# Patient Record
Sex: FEMALE | Race: ASIAN | URBAN - METROPOLITAN AREA
[De-identification: names, ages, dates, MRNs, and addresses within clinical notes are randomized per-mention and may not be internally consistent; named-entity substitution may affect disease eponyms.]

---

## 2019-07-05 ENCOUNTER — EMERGENCY (EMERGENCY)
Facility: HOSPITAL | Age: 59
LOS: 1 days | Discharge: LEFT BEFORE DISCHARGE | End: 2019-07-05
Attending: EMERGENCY MEDICINE | Admitting: EMERGENCY MEDICINE
Payer: MEDICAID

## 2019-07-05 VITALS
SYSTOLIC BLOOD PRESSURE: 139 MMHG | DIASTOLIC BLOOD PRESSURE: 88 MMHG | HEART RATE: 80 BPM | RESPIRATION RATE: 18 BRPM | TEMPERATURE: 98 F

## 2019-07-05 PROCEDURE — 99282 EMERGENCY DEPT VISIT SF MDM: CPT

## 2019-07-05 NOTE — ED PROVIDER NOTE - CLINICAL SUMMARY MEDICAL DECISION MAKING FREE TEXT BOX
Terrance: Has ALS. Accepted by [facility] for FB in throat or esophagus after eating a peach. No stridor. Feels she's speaking differently from usual. Notify ENT they're here.

## 2019-07-05 NOTE — ED PROVIDER NOTE - PHYSICAL EXAMINATION
Chronically-ill appearing, not acutely-ill appearing, well nourished, awake, alert, oriented to person, place, time/situation and in no apparent distress.    Airway patent. No stridor. Handling secretions.    Eyes without scleral injection. No jaundice.    Strong pulse.    Respirations unlabored.    Abdomen soft, non-tender, no guarding.    No synovitis.     Alert and oriented.    Skin normal color for race, warm, dry and intact. No evidence of rash.    No SI/HI.

## 2019-07-05 NOTE — CONSULT NOTE ADULT - SUBJECTIVE AND OBJECTIVE BOX
HPI: 59F with PMH of ALS, presents to the ED after episode of distress after eating. She was eating a peach ~6 hours ago and felt something get stuck in her throat. For a brief 15min period she had difficulty breathing. Now she denies any respiratory distress, no dysphagia, no odynophagia. Denies otalgia, vomiting, nausea, chest pain.     T(C): 36.6 (07-05-19 @ 19:25), Max: 36.6 (07-05-19 @ 19:25)  HR: 80 (07-05-19 @ 19:25) (80 - 80)  BP: 139/88 (07-05-19 @ 19:25) (139/88 - 139/88)  RR: 18 (07-05-19 @ 19:25) (18 - 18)  SpO2: --  NAD, AOx3  No respiratory distress, stridor, stertor on RA  Voice quality: normal  PERRL, EOMI  Nose: bilateral NC clear anteriorly, IT normal, mucosa normal  OC/OP: tongue and FOM soft, no lesions, OP clear  Neck: soft and flat, no LAD, no TTP  CN II-XII intact    FOE: NC/NP: bilateral IT and MT normal in appearance. mucosa normal, no lesions. NP clear  OP: BOT and tonsils normal, no lesions. Mucosa normal.  Suprglottis: Epiglottis, AE folds, Arytenoids all sharp without edema. Mucosa normal, no lesions.  Glottis: TVC fully mobile bilaterally, no lesions, airway grossly patent.  Subglottis: clear, no lesions  Hypopharynx: No pooling of secretions, mucosa normal, no lesions.    OSH CXR: per report clear, did not review    A/P: 59F with PMH ALS and voice changes after eating a peach, there are no gross anatomic abnormalities noted on fiberoptic exam, no FB noted.  - no acute ENT intervention  - diet advanced as tolerated  - pain control  - clear for discharge per ED  - please page with questions HPI: 59F with PMH of ALS, HTN, DM, presents to the ED after episode of distress after eating. She was eating a peach ~6 hours ago and felt something get stuck in her throat. For a brief 15min period she had difficulty breathing. Now she denies any respiratory distress, no dysphagia, no odynophagia. Denies otalgia, vomiting, nausea, chest pain.     T(C): 36.6 (07-05-19 @ 19:25), Max: 36.6 (07-05-19 @ 19:25)  HR: 80 (07-05-19 @ 19:25) (80 - 80)  BP: 139/88 (07-05-19 @ 19:25) (139/88 - 139/88)  RR: 18 (07-05-19 @ 19:25) (18 - 18)  SpO2: --  NAD, AOx3  No respiratory distress, stridor, stertor on RA  Voice quality: normal  PERRL, EOMI  Nose: bilateral NC clear anteriorly, IT normal, mucosa normal  OC/OP: tongue and FOM soft, no lesions, OP clear  Neck: soft and flat, no LAD, no TTP  CN II-XII intact    FOE: NC/NP: bilateral IT and MT normal in appearance. mucosa normal, no lesions. NP clear  OP: BOT and tonsils normal, no lesions. Mucosa normal.  Suprglottis: Epiglottis, AE folds, Arytenoids all sharp without edema. Mucosa normal, no lesions.  Glottis: TVC fully mobile bilaterally, no lesions, airway grossly patent.  Subglottis: clear, no lesions  Hypopharynx: No pooling of secretions, mucosa normal, no lesions.    OSH CXR: per report clear, did not review    A/P: 59F with PMH ALS and voice changes after eating a peach, there are no gross anatomic/functional abnormalities noted on fiberoptic exam, no FB noted.  - no acute ENT intervention  - diet advanced as tolerated  - pain control  - clear for discharge per ED  - please page with questions

## 2019-07-05 NOTE — ED ADULT NURSE NOTE - OBJECTIVE STATEMENT
Pt received in room 3, a/o x4. Pt comes in for c/o feeling something stuck in throat after eating a peach. Pt reports she felt she was choking, feels much better now and had some chest tightness. Pt transferred from [facility] for ENT evaluation, ENT at bedside and pt awaiting further testing. Pt in no acute distress, vs as noted and will monitor and await further orders.

## 2019-07-05 NOTE — ED ADULT NURSE NOTE - CHIEF COMPLAINT QUOTE
Pt s/p possible choking sent from [facility] for ENT.  Pt with hx of ALS.  no drooling, stridor noted at triage

## 2019-07-05 NOTE — ED CLERICAL - CLERICAL COMMENTS
pt sent for r/o foreign body in throat. while eating a peach pt felt something in throat. no stridor, sob

## 2019-07-05 NOTE — ED PROVIDER NOTE - ATTENDING CONTRIBUTION TO CARE
I performed a face-to-face evaluation of the patient and performed a history and physical examination. I agree with the history and physical examination.    Terrance: Has ALS. Accepted by [facility] for FB in throat or esophagus after eating a peach. No stridor. Feels she's speaking differently from usual. Notify ENT they're here.

## 2019-07-05 NOTE — ED PROVIDER NOTE - NSFOLLOWUPINSTRUCTIONS_ED_ALL_ED_FT
Follow up with your doctors. Stay hydrated. Return if difficulty breathing, speaking, or swallowing.

## 2019-07-05 NOTE — ED ADULT TRIAGE NOTE - CHIEF COMPLAINT QUOTE
Pt s/p possible choking sent from [facility] for ENT.  Pt with hx of ALS.  no drooling, stridor noted at triage

## 2019-07-05 NOTE — ED PROVIDER NOTE - OBJECTIVE STATEMENT
Terrance: 59 F, ALS, sent from [facility]. Was eating a peach. Felt it stuck in bottom of esophagus; this resolved after 1/2 hour. Then, felt she couldn't speak well. No airway difficulty. Accepted by [facility]. Seen here and scoped. No obstruction/FB/abnormal vocal cord movement. Pt. feels better and wants to got home; lives with family.

## 2020-09-02 PROBLEM — Z00.00 ENCOUNTER FOR PREVENTIVE HEALTH EXAMINATION: Status: ACTIVE | Noted: 2020-09-02

## 2020-09-08 ENCOUNTER — APPOINTMENT (OUTPATIENT)
Dept: NEUROLOGY | Facility: CLINIC | Age: 60
End: 2020-09-08
Payer: MEDICARE

## 2020-09-08 VITALS
WEIGHT: 155 LBS | BODY MASS INDEX: 27.46 KG/M2 | DIASTOLIC BLOOD PRESSURE: 85 MMHG | HEART RATE: 83 BPM | SYSTOLIC BLOOD PRESSURE: 128 MMHG | HEIGHT: 63 IN

## 2020-09-08 VITALS — TEMPERATURE: 97.4 F

## 2020-09-08 DIAGNOSIS — E11.9 TYPE 2 DIABETES MELLITUS W/OUT COMPLICATIONS: ICD-10-CM

## 2020-09-08 DIAGNOSIS — I10 ESSENTIAL (PRIMARY) HYPERTENSION: ICD-10-CM

## 2020-09-08 PROCEDURE — 99205 OFFICE O/P NEW HI 60 MIN: CPT

## 2020-09-08 RX ORDER — METFORMIN HYDROCHLORIDE 500 MG/5ML
500 SOLUTION ORAL
Refills: 0 | Status: ACTIVE | COMMUNITY

## 2020-09-08 RX ORDER — VALSARTAN 160 MG/1
160 TABLET, COATED ORAL DAILY
Refills: 0 | Status: ACTIVE | COMMUNITY

## 2020-09-08 NOTE — PHYSICAL EXAM
[General Appearance - Alert] : alert [General Appearance - In No Acute Distress] : in no acute distress [Place] : oriented to place [Person] : oriented to person [Time] : oriented to time [Short Term Intact] : short term memory intact [Remote Intact] : remote memory intact [Registration Intact] : recent registration memory intact [Concentration Intact] : normal concentrating ability [Visual Intact] : visual attention was ~T not ~L decreased [Naming Objects] : no difficulty naming common objects [Repeating Phrases] : no difficulty repeating a phrase [Writing A Sentence] : no difficulty writing a sentence [Fluency] : fluency intact [Comprehension] : comprehension intact [Reading] : reading intact [Past History] : adequate knowledge of personal past history [Cranial Nerves Optic (II)] : visual acuity intact bilaterally,  visual fields full to confrontation, pupils equal round and reactive to light [Cranial Nerves Oculomotor (III)] : extraocular motion intact [Cranial Nerves Trigeminal (V)] : facial sensation intact symmetrically [Cranial Nerves Facial (VII)] : face symmetrical [Cranial Nerves Vestibulocochlear (VIII)] : hearing was intact bilaterally [Cranial Nerves Glossopharyngeal (IX)] : tongue and palate midline [Cranial Nerves Accessory (XI - Cranial And Spinal)] : head turning and shoulder shrug symmetric [Motor Handedness Right-Handed] : the patient is right hand dominant [3] : shoulder adduction 3/5 [2] : wrist flexion 2/5 [Hand Weakness Right] : the hand  was weak [-4] : fingers flexion -4/5 [Hand Weakness Left] : the hand  was weak [0] : fingers flexion 0/5 [5] : ankle plantar flexion 5/5 [+4] : ankle inversion +4/5 [4] : ankle eversion 4/5 [Sensation Tactile Decrease] : light touch was intact [Proprioception] : proprioception was intact [Sensation Vibration Decrease] : vibration was intact [3+] : Biceps left 3+ [2+] : Brachioradialis left 2+ [4+] : Ankle jerk left 4+ [Plantar Reflex Right Only] : abnormal on the right [___] : [unfilled] ~Ubeats present on the right [___] : [unfilled] ~Ubeats present on the left [Neck Appearance] : the appearance of the neck was normal [Jugular Venous Distention Increased] : there was no jugular-venous distention [Neck Cervical Mass (___cm)] : no neck mass was observed [Thyroid Nodule] : there were no palpable thyroid nodules [Thyroid Diffuse Enlargement] : the thyroid was not enlarged [] : no respiratory distress [Auscultation Breath Sounds / Voice Sounds] : lungs were clear to auscultation bilaterally [Heart Rate And Rhythm] : heart rate was normal and rhythm regular [Heart Sounds] : normal S1 and S2 [Heart Sounds Gallop] : no gallops [Murmurs] : no murmurs [Heart Sounds Pericardial Friction Rub] : no pericardial rub [Past-pointing] : there was no past-pointing [Tremor] : no tremor present [Plantar Reflex Left Only] : normal on the left [FreeTextEntry5] : fundi normal.  Very slow tongue movements, fascics on tongue [FreeTextEntry6] : fascics on back and arms.  Tone increased LLE, shoulder girdle atrophy and intrinsic hand atrophy noted [FreeTextEntry8] : slightly broad based gait

## 2020-09-08 NOTE — HISTORY OF PRESENT ILLNESS
[FreeTextEntry1] : Patient presents for evaluation of possible ALS.  She gives history with her daughter.  They state that in 2012 she first noted weakness left hand which progressed to involve the left arm in about a year.  She denies sensory symptoms but noted twitches of the left hand and arm muscles.  By two years later she noted weakness right hand and then arm which remained weak and has slowly worsened.  The left arm and hand are now plegic.  About two years ago they noted trouble rising from a chair.  \par \par She has some cramping of feet and calves, no pain in legs.  She now notes widespread muscle twitches.  She started slurring about 12-18 months ago, she coughs on food and liquids now about once weekly.  She has lately been losing 3-4 lbs. per month.  She gets SOB both at rest and worse with exertion.  She also cannot lie flat due to orthopnea at night.  \par \par She denies FH of ALS.

## 2020-09-08 NOTE — ASSESSMENT
[FreeTextEntry1] : This patient has a well-defined and progressive UMN and LMN purely motor disease which meets [place] clinical criteria for definite ALS.  \par \par Will check baseline LFT's and CBC, CPK and start riluzole and have patient back next week for ALS clinic.  She will need all services for evaluation including swallow study and eval.  \par \par Above explained at length with patient and daughter.

## 2020-09-11 LAB
ALBUMIN SERPL ELPH-MCNC: 4.4 G/DL
ALP BLD-CCNC: 44 U/L
ALT SERPL-CCNC: 17 U/L
ANION GAP SERPL CALC-SCNC: 14 MMOL/L
AST SERPL-CCNC: 17 U/L
BASOPHILS # BLD AUTO: 0.02 K/UL
BASOPHILS NFR BLD AUTO: 0.4 %
BILIRUB SERPL-MCNC: 0.5 MG/DL
BUN SERPL-MCNC: 15 MG/DL
CALCIUM SERPL-MCNC: 10 MG/DL
CHLORIDE SERPL-SCNC: 104 MMOL/L
CK SERPL-CCNC: 299 U/L
CO2 SERPL-SCNC: 21 MMOL/L
CREAT SERPL-MCNC: 0.51 MG/DL
EOSINOPHIL # BLD AUTO: 0.11 K/UL
EOSINOPHIL NFR BLD AUTO: 2.1 %
GLUCOSE SERPL-MCNC: 114 MG/DL
HCT VFR BLD CALC: 42.1 %
HGB BLD-MCNC: 13.6 G/DL
IMM GRANULOCYTES NFR BLD AUTO: 0.2 %
LYMPHOCYTES # BLD AUTO: 2.37 K/UL
LYMPHOCYTES NFR BLD AUTO: 45.4 %
MAN DIFF?: NORMAL
MCHC RBC-ENTMCNC: 29.3 PG
MCHC RBC-ENTMCNC: 32.3 GM/DL
MCV RBC AUTO: 90.7 FL
MONOCYTES # BLD AUTO: 0.35 K/UL
MONOCYTES NFR BLD AUTO: 6.7 %
NEUTROPHILS # BLD AUTO: 2.36 K/UL
NEUTROPHILS NFR BLD AUTO: 45.2 %
PLATELET # BLD AUTO: 229 K/UL
POTASSIUM SERPL-SCNC: 4.1 MMOL/L
PROT SERPL-MCNC: 6.6 G/DL
RBC # BLD: 4.64 M/UL
RBC # FLD: 13.2 %
SODIUM SERPL-SCNC: 140 MMOL/L
WBC # FLD AUTO: 5.22 K/UL

## 2020-09-14 ENCOUNTER — APPOINTMENT (OUTPATIENT)
Dept: NEUROLOGY | Facility: CLINIC | Age: 60
End: 2020-09-14
Payer: MEDICARE

## 2020-09-14 VITALS — TEMPERATURE: 97.9 F

## 2020-09-14 VITALS
WEIGHT: 150 LBS | DIASTOLIC BLOOD PRESSURE: 83 MMHG | HEART RATE: 83 BPM | HEIGHT: 63 IN | BODY MASS INDEX: 26.58 KG/M2 | SYSTOLIC BLOOD PRESSURE: 134 MMHG

## 2020-09-14 PROCEDURE — 99215 OFFICE O/P EST HI 40 MIN: CPT | Mod: 25

## 2020-09-15 NOTE — PHYSICAL EXAM
[Person] : oriented to person [Place] : oriented to place [Time] : oriented to time [Short Term Intact] : short term memory intact [Registration Intact] : recent registration memory intact [Remote Intact] : remote memory intact [Concentration Intact] : normal concentrating ability [Visual Intact] : visual attention was ~T not ~L decreased [Naming Objects] : no difficulty naming common objects [Writing A Sentence] : no difficulty writing a sentence [Fluency] : fluency intact [Repeating Phrases] : no difficulty repeating a phrase [Reading] : reading intact [Comprehension] : comprehension intact [Past History] : adequate knowledge of personal past history [Cranial Nerves Optic (II)] : visual acuity intact bilaterally,  visual fields full to confrontation, pupils equal round and reactive to light [Cranial Nerves Facial (VII)] : face symmetrical [Cranial Nerves Vestibulocochlear (VIII)] : hearing was intact bilaterally [Cranial Nerves Oculomotor (III)] : extraocular motion intact [Cranial Nerves Trigeminal (V)] : facial sensation intact symmetrically [Cranial Nerves Glossopharyngeal (IX)] : tongue and palate midline [Cranial Nerves Accessory (XI - Cranial And Spinal)] : head turning and shoulder shrug symmetric [Motor Handedness Right-Handed] : the patient is right hand dominant [3] : shoulder adduction 3/5 [2] : wrist flexion 2/5 [-4] : wrist extension -4/5 [Hand Weakness Right] : the hand  was weak [Hand Weakness Left] : the hand  was weak [0] : fingers flexion 0/5 [5] : ankle plantar flexion 5/5 [+4] : ankle inversion +4/5 [4] : ankle eversion 4/5 [Sensation Vibration Decrease] : vibration was intact [Proprioception] : proprioception was intact [Sensation Tactile Decrease] : light touch was intact [3+] : Biceps left 3+ [2+] : Brachioradialis right 2+ [4+] : Ankle jerk left 4+ [Plantar Reflex Right Only] : abnormal on the right [___] : [unfilled] ~Ubeats present on the right [___] : [unfilled] ~Ubeats present on the left [Tremor] : no tremor present [Past-pointing] : there was no past-pointing [Plantar Reflex Left Only] : normal on the left [FreeTextEntry8] : slightly broad based gait [FreeTextEntry5] : fundi normal.  Very slow tongue movements, fascics on tongue [FreeTextEntry6] : fascics on back and arms.  Tone increased LLE, shoulder girdle atrophy and intrinsic hand atrophy noted

## 2020-09-15 NOTE — HISTORY OF PRESENT ILLNESS
[FreeTextEntry1] : Patient here for f/u, I saw her a week ago and diagnosed ALS.  She presents for multi-disciplinary clinic today to have all evaluations.  \par \par

## 2020-09-15 NOTE — ASSESSMENT
[FreeTextEntry1] : FARTUN BRANDT evaluated by me today  in multidisciplinary ALS clinic; requires evaluations today by PT/OT/speech and swallow therapists.  Social work needs addressed and goals of care reinforced. End of life care including health care proxy and patient wishes discussed.\par \par Nutrition/dietary needs discussed and addressed  \par \par Pulmonary function assessed: O2 sat 95, ET CO2 35, RR 20 HR 79, NIF -45, rec: cough assist.  \par \par Saliva management needs assessed - none\par \par PT/OT recommends: home evaluation for PT/OT, requesting tub transfer bench from ALS\par \par Speech/Swallow therapy recommends:  Dysarthric.  AAC recommended - will refer to [facility]. Swallow is compromised and will have PEG placed, will d/w daughter.  \par \par Continue riluzole 50m BID\par \par f/u in 3 months at ALS clinic.  Genetic testing\par \par

## 2020-09-19 ENCOUNTER — FORM ENCOUNTER (OUTPATIENT)
Age: 60
End: 2020-09-19

## 2021-03-08 ENCOUNTER — APPOINTMENT (OUTPATIENT)
Dept: NEUROLOGY | Facility: CLINIC | Age: 61
End: 2021-03-08
Payer: MEDICARE

## 2021-03-08 VITALS
HEART RATE: 94 BPM | HEIGHT: 63 IN | SYSTOLIC BLOOD PRESSURE: 110 MMHG | BODY MASS INDEX: 26.4 KG/M2 | DIASTOLIC BLOOD PRESSURE: 74 MMHG | WEIGHT: 149 LBS

## 2021-03-08 PROCEDURE — 97162 PT EVAL MOD COMPLEX 30 MIN: CPT | Mod: GP,GO

## 2021-03-08 PROCEDURE — 99072 ADDL SUPL MATRL&STAF TM PHE: CPT

## 2021-03-08 PROCEDURE — 92610 EVALUATE SWALLOWING FUNCTION: CPT

## 2021-03-08 PROCEDURE — 97166 OT EVAL MOD COMPLEX 45 MIN: CPT | Mod: GO,GP

## 2021-03-08 PROCEDURE — 99215 OFFICE O/P EST HI 40 MIN: CPT | Mod: 25

## 2021-03-08 NOTE — PHYSICAL EXAM
[Person] : oriented to person [Place] : oriented to place [Time] : oriented to time [Short Term Intact] : short term memory intact [Remote Intact] : remote memory intact [Registration Intact] : recent registration memory intact [Concentration Intact] : normal concentrating ability [Visual Intact] : visual attention was ~T not ~L decreased [Naming Objects] : no difficulty naming common objects [Repeating Phrases] : no difficulty repeating a phrase [Writing A Sentence] : no difficulty writing a sentence [Fluency] : fluency intact [Comprehension] : comprehension intact [Reading] : reading intact [Past History] : adequate knowledge of personal past history [Cranial Nerves Optic (II)] : visual acuity intact bilaterally,  visual fields full to confrontation, pupils equal round and reactive to light [Cranial Nerves Oculomotor (III)] : extraocular motion intact [Cranial Nerves Trigeminal (V)] : facial sensation intact symmetrically [Cranial Nerves Facial (VII)] : face symmetrical [Cranial Nerves Vestibulocochlear (VIII)] : hearing was intact bilaterally [Cranial Nerves Glossopharyngeal (IX)] : tongue and palate midline [Cranial Nerves Accessory (XI - Cranial And Spinal)] : head turning and shoulder shrug symmetric [Motor Handedness Right-Handed] : the patient is right hand dominant [2] : wrist flexion 2/5 [Hand Weakness Right] : the hand  was weak [Hand Weakness Left] : the hand  was weak [0] : fingers flexion 0/5 [Sensation Tactile Decrease] : light touch was intact [Sensation Vibration Decrease] : vibration was intact [3+] : Biceps left 3+ [2+] : Brachioradialis left 2+ [4+] : Ankle jerk left 4+ [Plantar Reflex Right Only] : abnormal on the right [___] : [unfilled] ~Ubeats present on the right [___] : [unfilled] ~Ubeats present on the left [General Appearance - In No Acute Distress] : in no acute distress [Affect] : the affect was normal [3] : wrist extension 3/5 [+4] : ankle inversion +4/5 [-4] : hip extension -4/5 [4] : ankle inversion 4/5 [Past-pointing] : there was no past-pointing [Tremor] : no tremor present [Plantar Reflex Left Only] : normal on the left [FreeTextEntry5] : fundi normal.  Very slow tongue movements, fascics on tongue    [FreeTextEntry6] : fascics on back and arms.  Tone increased LLE, shoulder girdle  atrophy and intrinsic hand atrophy noted   ALSFRS-23/48 [FreeTextEntry8] : slightly broad based gait

## 2021-03-08 NOTE — ASSESSMENT
[FreeTextEntry1] : FARTUN BRANDT evaluated by me today  in multidisciplinary ALS clinic; requires evaluations today by PT/OT/speech and swallow therapists.  Social work needs addressed and goals of care reinforced. End of life care including health care proxy and patient wishes discussed.\par \par Nutrition/dietary needs discussed and addressed.  \par \par Pulmonary function assessed by respiratory therapy and spirometry.  Patient’s respiratory function is: could not make a seal or open mouth for PFT.  O2 sat 97.  Benefits of cough assist re-inforced.  \par \par Saliva management needs assessed - no needs. \par \par PT/OT recommends: Not moving RUE, home care has assessed.  Appropriate to get a power WC.  Patient is being seen today for the face-to-face visit for evaluation for a powered mobility device (PMD).  Patient has a diagnosis of ALS/motor neuron disease.  This disease is incurable.  The weakness currently seen in limb, trunk, neck and respiratory muscles is expected to increase as the disease progresses.  Considering the physical exam listed above, patient is unable to functionally ambulate safely in their home.  Furthermore, the strength in his/her arms is insufficient to bear the amount of weight required for walking with any type of assistive device.  Patient has a recent history of falling while using a walker due to insufficient arm and leg strength.  They are unable to propel even a properly configured ultra lightweight manual wheelchair due to upper extremity weakness, imbalance, decreased range of motion, paralysis, and cardiac/respiratory compromise.  In addition, they cannot use a Scooter safely in their home due to postural deformity, need for positioning, and inability to use a tiller secondary to decreased arm strength.  A power wheelchair is the only option for safe and independent mobility in their residence.  This patient is cognitively and physically able to operate a power wheelchair throughout their home and is willing to utilize it in their home.  The patient's vision is also within functional limitations for safe driving.\par \par Functional decline - no PT needs at this time. \par \par Speech/Swallow therapy recommends: we have recommended PEG and thickit but but they decline this.  MPT 2 seconds.  \par \par Continue riluzole 50m BID\par \par f/u in 3 months at ALS clinic\par \par

## 2021-03-08 NOTE — HISTORY OF PRESENT ILLNESS
[FreeTextEntry1] : Patient presents for ALS clinic \par \par Here to get reevaluated  from Multidisciplinary team.\par \par Since she was last seen in September, she  continues on Riluzole 50mg BID can have some nausea intermittently, which she takes Zofran Prn\par \par Daughter reports the only change is her difficulty at night as she has increased SOB and has to wake frequently  to catch her Breath. Sleep on 1 pillow and has not tired more.

## 2021-04-07 ENCOUNTER — NON-APPOINTMENT (OUTPATIENT)
Age: 61
End: 2021-04-07

## 2021-04-15 ENCOUNTER — APPOINTMENT (OUTPATIENT)
Dept: DISASTER EMERGENCY | Facility: OTHER | Age: 61
End: 2021-04-15
Payer: MEDICARE

## 2021-04-15 PROCEDURE — 0012A: CPT

## 2021-04-28 ENCOUNTER — APPOINTMENT (OUTPATIENT)
Dept: SPEECH THERAPY | Facility: CLINIC | Age: 61
End: 2021-04-28
Payer: MEDICARE

## 2021-04-28 PROCEDURE — 92607 EX FOR SPEECH DEVICE RX 1HR: CPT | Mod: GN

## 2021-04-28 PROCEDURE — 92608 EX FOR SPEECH DEVICE RX ADDL: CPT | Mod: GN

## 2021-06-16 NOTE — ED ADULT NURSE NOTE - CAS EDP DISCH TYPE
RN cannot approve Refill Request    RN can NOT refill this medication PCP messaged that patient is overdue for Office Visit. Last office visit: 9/15/2017 Pipo Helm MD Last Physical: 2/21/2020 Last MTM visit: Visit date not found Last visit same specialty: 9/15/2017 Pipo Helm MD.  Next visit within 3 mo: Visit date not found  Next physical within 3 mo: Visit date not found      Leilani Perez, [facility] Triage/Med Refill 4/12/2021    Requested Prescriptions   Pending Prescriptions Disp Refills     simvastatin (ZOCOR) 40 MG tablet [Pharmacy Med Name: Simvastatin Oral Tablet 40 MG] 90 tablet 0     Sig: TAKE 1 TABLET BY MOUTH EVERYDAY AT BEDTIME       Statins Refill Protocol (Hmg CoA Reductase Inhibitors) Failed - 4/12/2021  2:01 AM        Failed - PCP or prescribing provider visit in past 12 months      Last office visit with prescriber/PCP: 9/15/2017 Pipo Helm MD OR same dept: Visit date not found OR same specialty: 9/15/2017 Pipo Helm MD  Last physical: 2/21/2020 Last MTM visit: Visit date not found   Next visit within 3 mo: Visit date not found  Next physical within 3 mo: Visit date not found  Prescriber OR PCP: Pipo Helm MD  Last diagnosis associated with med order: 1. Pure hypercholesterolemia  - simvastatin (ZOCOR) 40 MG tablet [Pharmacy Med Name: Simvastatin Oral Tablet 40 MG]; TAKE 1 TABLET BY MOUTH EVERYDAY AT BEDTIME  Dispense: 90 tablet; Refill: 0    If protocol passes may refill for 12 months if within 3 months of last provider visit (or a total of 15 months).                   
Home

## 2021-10-06 PROBLEM — I10 ESSENTIAL HYPERTENSION: Status: ACTIVE | Noted: 2020-09-08

## 2021-11-10 ENCOUNTER — APPOINTMENT (OUTPATIENT)
Dept: NEUROLOGY | Facility: CLINIC | Age: 61
End: 2021-11-10
Payer: MEDICARE

## 2021-11-10 VITALS
SYSTOLIC BLOOD PRESSURE: 116 MMHG | WEIGHT: 141 LBS | HEIGHT: 63 IN | HEART RATE: 92 BPM | DIASTOLIC BLOOD PRESSURE: 80 MMHG | BODY MASS INDEX: 24.98 KG/M2

## 2021-11-10 DIAGNOSIS — G12.21 AMYOTROPHIC LATERAL SCLEROSIS: ICD-10-CM

## 2021-11-10 PROCEDURE — 99213 OFFICE O/P EST LOW 20 MIN: CPT

## 2021-11-12 NOTE — HISTORY OF PRESENT ILLNESS
[FreeTextEntry1] : There has been a loss of strength since last visit everywhere. She notes that the speech is less clear, more slurred than before. Patient may rarely choke on water 1x/week, otherwise no issues with swallowing. Usually uses 1 pillow to sleep. Sometimes uses an angled pillow to help with breathing. Usually has difficulty breathing at night. Patient ambulates slowly at home. She cannot use her arms. \par \par Has not had breathing device approved yet. Patient is taking riluzole.

## 2021-11-12 NOTE — ASSESSMENT
[FreeTextEntry1] : Patient has progression of weakness in upper extremities and increased dysarthria. \par \par Patient is being seen today for the face-to-face visit for evaluation for a powered mobility device (PMD).  Patient has a diagnosis of ALS/motor neuron disease.  This disease is incurable.  The weakness currently seen in limb, trunk, neck and respiratory muscles is expected to increase as the disease progresses.  Considering the physical exam listed above, patient is unable to functionally ambulate safely in their home.  Furthermore, the strength in his/her arms is insufficient to bear the amount of weight required for walking with any type of assistive device.  Patient has a recent history of falling while using a walker due to insufficient arm and leg strength.  They are unable to propel even a properly configured ultra lightweight manual wheelchair due to upper extremity weakness, imbalance, decreased range of motion, paralysis, and cardiac/respiratory compromise.  In addition, they cannot use a Scooter safely in their home due to postural deformity, need for positioning, and inability to use a tiller secondary to decreased arm strength.  A power wheelchair is the only option for safe and independent mobility in their residence.  This patient is cognitively and physically able to operate a power wheelchair throughout their home and is willing to utilize it in their home.  The patient's vision is also within functional limitations for safe driving.\par \par Continue riluzole 50 mg BID\par Follow up in ALS clinic in 1/2022

## 2021-11-12 NOTE — PHYSICAL EXAM
[Person] : oriented to person [Place] : oriented to place [Time] : oriented to time [Short Term Intact] : short term memory intact [Remote Intact] : remote memory intact [Registration Intact] : recent registration memory intact [Concentration Intact] : normal concentrating ability [Visual Intact] : visual attention was ~T not ~L decreased [Naming Objects] : no difficulty naming common objects [Repeating Phrases] : no difficulty repeating a phrase [Writing A Sentence] : no difficulty writing a sentence [Fluency] : fluency intact [Comprehension] : comprehension intact [Reading] : reading intact [Past History] : adequate knowledge of personal past history [Cranial Nerves Optic (II)] : visual acuity intact bilaterally,  visual fields full to confrontation, pupils equal round and reactive to light [Cranial Nerves Oculomotor (III)] : extraocular motion intact [Cranial Nerves Trigeminal (V)] : facial sensation intact symmetrically [Cranial Nerves Facial (VII)] : face symmetrical [Cranial Nerves Vestibulocochlear (VIII)] : hearing was intact bilaterally [Cranial Nerves Glossopharyngeal (IX)] : tongue and palate midline [Cranial Nerves Accessory (XI - Cranial And Spinal)] : head turning and shoulder shrug symmetric [Motor Handedness Right-Handed] : the patient is right hand dominant [Hand Weakness Right] : the hand  was weak [Hand Weakness Left] : the hand  was weak [-4] : hip extension -4/5 [5] : knee extension 5/5 [+4] : ankle dorsiflexion +4/5 [4] : ankle inversion 4/5 [Sensation Tactile Decrease] : light touch was intact [Sensation Vibration Decrease] : vibration was intact [2+] : Brachioradialis left 2+ [3+] : Patella left 3+ [4+] : Ankle jerk left 4+ [Plantar Reflex Right Only] : abnormal on the right [___] : [unfilled] ~Ubeats present on the right [___] : [unfilled] ~Ubeats present on the left [0] : arm extension 0/5 [Past-pointing] : there was no past-pointing [Tremor] : no tremor present [Plantar Reflex Left Only] : normal on the left [FreeTextEntry5] : fundi normal.  Very slow tongue movements, fascics on tongue and atrophy   [FreeTextEntry6] : fascics on back and arms. Tone increased LLE, shoulder girdle  atrophy and intrinsic hand atrophy noted   ALSFRS-22/48 [FreeTextEntry8] : slightly broad based gait

## 2022-01-02 NOTE — ED ADULT TRIAGE NOTE - STATUS:
2019 NOVEL CORONAVIRUS (SARS-COV-2): [id number]  Order: [id number]   Status: Final result     Visible to patient: Yes (seen)     Dx: Suspected COVID-19 virus infection     0 Result Notes     Ref Range & Units    SARS-CoV-2 by PCR Not Detected / Detected / Inhibitor Present Detected Abnormal             Applied

## 2022-02-14 ENCOUNTER — APPOINTMENT (OUTPATIENT)
Dept: NEUROLOGY | Facility: CLINIC | Age: 62
End: 2022-02-14
Payer: MEDICARE

## 2022-02-14 ENCOUNTER — NON-APPOINTMENT (OUTPATIENT)
Age: 62
End: 2022-02-14

## 2022-02-14 ENCOUNTER — APPOINTMENT (OUTPATIENT)
Dept: NEUROLOGY | Facility: CLINIC | Age: 62
End: 2022-02-14

## 2022-02-14 VITALS
BODY MASS INDEX: 24.8 KG/M2 | HEIGHT: 63 IN | HEART RATE: 79 BPM | WEIGHT: 140 LBS | RESPIRATION RATE: 15 BRPM | DIASTOLIC BLOOD PRESSURE: 88 MMHG | SYSTOLIC BLOOD PRESSURE: 141 MMHG

## 2022-02-14 PROCEDURE — 97165 OT EVAL LOW COMPLEX 30 MIN: CPT | Mod: GP,GO

## 2022-02-14 PROCEDURE — 92610 EVALUATE SWALLOWING FUNCTION: CPT

## 2022-02-14 PROCEDURE — 97162 PT EVAL MOD COMPLEX 30 MIN: CPT | Mod: GP,GO

## 2022-02-14 PROCEDURE — 99215 OFFICE O/P EST HI 40 MIN: CPT | Mod: 25

## 2022-02-14 NOTE — REASON FOR VISIT
[Follow-Up] : follow-up for [Clinical Swallow] : clinical swallow evaluation [Speech and Language] : speech and language evaluation [Other: _____] : [unfilled]

## 2022-02-14 NOTE — ASSESSMENT
[FreeTextEntry1] : Clinical Swallow Evaluation and Speech/ Language/ Voice Evaluation \par \par Patient was seen during ALS clinic this date for a clinical re-assessment of speech-language and swallow function. Patient was accompanied by daughter who served as a reliable informant regarding patient's overall health, communication and swallow function  being that patient is severely dysarthric and did not bring AAC device to today's appointment. Patient successfully uses device at home with family and is currently receiving assistance from [facility] (Daphne Edmonds). As per daughter, patient continues to eat/drink by mouth given wishes for no feeding tube despite understanding risks involved (aspiration, pneumonia and respiratory complications). Patient continues to consume soft solids and thin liquids. She denies coughing during meals and recent history of pneumonia. Daughter reports weight loss of ~16 lbs over the last year. The patient is known to this department from previous ALS clinics and AAC evaluation.\par \par Oroperipheral Examination: Facial symmetry was intact. The oral cavity was well hydrated. Secretion management was adequate. Labial strength and ROM was moderately reduced upon pursing and retraction. Lingual strength and ROM was moderately reduced upon protrusion, elevation and lateralization, +fasciculations.  There are fasciculations noted upon protrusion. Upper airway breath sounds were clear. \par \par Speech-Language / Voicet: The patient demonstrates a severe dysarthria characterized by slow, laborious speech accompanied by impaired articulatory precision and reduced breath support. Overall speech intelligibility was judged to be moderately to severely reduced at the single word level.  A strained, breathy vocal quality was noted across minimal speech output. MPT was 2.5 seconds, which is severely reduced for patient's age/gender. Diadochokinetic tasks were slow and imprecise. \par \par Oral Feeding Assessment: \par Consistencies Administered: Puree; Honey-thick liquid; Thin liquid\par 1.Oral Phase: The patient demonstrated a moderate oral dysphagia characterized by weak labial stripping of bolus from utensil followed by prolonged bolus collection, manipulation and transport with suspected premature spillage over the base of tongue for puree, honey-thick and thin liquids. Adequate labial containment noted at this time.  \par 2. Pharyngeal Phase: The patient demonstrated a moderate to severe pharyngeal dysphagia characterized by delayed swallow trigger, reduced hyolaryngeal excursion upon digital palpation and use of multiple (labored) swallows suggestive of pharyngeal stasis. Inconsistent delayed coughing noted post deglutition for puree honey-thick and thin liquids suggestive of laryngeal penetration vs aspiration.\par \par Impressions: \par 1) Moderate to Severe Oropharyngeal Dysphagia\par 2) Severe Dysarthria \par \par RECOMMENDATIONS:\par 1) Oral nutrition/hydration/medication is deemed contraindicated at this time. Consider long-term non-oral means of nutrition/hydration/medication. \par 2) Defer to MD to continue GOC discussion regarding oral vs non-oral feeds given patient wishes to continue PO intake despite understanding risks involved (i.e. aspiration, pneumonia and/or respiratory distress). \par 3) Maintain Aspiration Precautions and meticulous oral hygiene\par 4) Pulmonary Consult is warranted to monitor pulmonary status given patient wishes to continue oral feedings in the setting of dysphagia and high aspiration risk\par 5) Continue Speech Therapy targeting use of AAC device\par 6) Follow up with Neurologist and ALS clinic as directed\par \par EDUCATION: The aforementioned recommendations were discussed at length with the patient and her daughter. In addition, risks of continuing PO feeds were reviewed, as well as strategies to maximize feeding safety and efficiency given patient wishes to continue oral feeds at this time. The patient was also encouraged to continue use of AAC device. Clinician contact information was provided. Full understanding was demonstrated. \par \par Simona Cary M.S., CCC-SLP\par Speech-Language Pathologist

## 2022-02-28 ENCOUNTER — TRANSCRIPTION ENCOUNTER (OUTPATIENT)
Age: 62
End: 2022-02-28

## 2022-03-01 NOTE — ASSESSMENT
[FreeTextEntry1] : Amyotrophic lateral sclerosis (G 12.21) (335.20). ALS FRS score 19.\par \par Evaluated by me today in multidisciplinary ALS clinic; required evaluations today by PT/OT/speech and swallowing therapists.  Social work needs addressed and goals of care reinforced.  End-of-life care including healthcare proxy and patient wishes were discussed.\par \par Nutritional/dietary needs discussed and addressed.\par \par Pulmonary function was assessed by respiratory therapy and spirometry.  Patient's respiratory function is very impaired. Needs NIV. Device Ordered. Pulmonary \par \par Saliva management needs assessed : no needs now.\par \par  PT/OT recommends: Power chair made available 2 weeks ago with training to drive with hands or eyes. Before the chair fell once; doing well. [employer] lift made available through the chapter.  Exhausted her PT benefits already. \par \par  Speech swallow therapy recommends: A& C device; severe dysphagia adjusted foods; does not want a GTube; strategies for swallowing; but envision need for G tube; she did cough when tested swallowing; likely silently aspirating. \par \par  Continue riluzole 50 mg twice daily \par \par  Refill \par \par  Follow-up in 6 months at ALS clinic \par \par

## 2022-03-01 NOTE — PHYSICAL EXAM
[Person] : oriented to person [Place] : oriented to place [Time] : oriented to time [Short Term Intact] : short term memory intact [Remote Intact] : remote memory intact [Registration Intact] : recent registration memory intact [Span Intact] : the attention span was normal [Concentration Intact] : normal concentrating ability [Cranial Nerves Optic (II)] : visual acuity intact bilaterally,  visual fields full to confrontation, pupils equal round and reactive to light [Visual Intact] : visual attention was ~T not ~L decreased [Cranial Nerves Oculomotor (III)] : extraocular motion intact [Cranial Nerves Vestibulocochlear (VIII)] : hearing was intact bilaterally [Cranial Nerves Facial Peripheral - Right Only] : peripheral 7th nerve weakness [Cranial Nerves Right Facial: House Grade ___(1-6)] : grade IV (moderately severe, 40%) facial nerve function [Cranial Nerves Facial Peripheral - Left Only] : peripheral 7th nerve weakness [Cranial Nerves Left Facial: House Grade ___(1-6)] : grade IV (moderately severe, 40%) facial nerve function [Diminished Palate Elevation] : palate elevation was diminished [CNS Accessory - Diminished Shoulder Elevation (Trapezius)] : weakness of shoulder elevation was present bilaterally [CNS Accessory - Sternocleidomastoid Weakness Bilateral] : sternocleidomastoid weakness was present bilaterally [Motor Strength Upper Extremities Bilaterally] : there was weakness in both upper extremities [Motor Strength Lower Extremities Bilaterally] : there was weakness in both lower extremities [0] : thumb flexion 0/5 [1] : hip external rotation 1/5 [2] : knee extension 2/5 [3] : ankle plantar flexion 3/5 [3+] : Ankle jerk left 3+ [Plantar Reflex Right Only] : abnormal on the right [Plantar Reflex Left Only] : abnormal on the left [Sclera] : the sclera and conjunctiva were normal [PERRL With Normal Accommodation] : pupils were equal in size, round, reactive to light, with normal accommodation [Extraocular Movements] : extraocular movements were intact [Absent Gag Reflex] : the gag was present [CNS Hypoglossal Tongue Protrusion Deviated To Right] : tongue does not deviate to the right [CNS Hypoglossal Tongue Protrusion Deviated To Left] : tongue does not deviate to the left [FreeTextEntry5] : Weak extension flexion neck and turning; weak tongue protrusion [Outer Ear] : the ears and nose were normal in appearance [Oropharynx] : the oropharynx was normal [Neck Appearance] : the appearance of the neck was normal [Neck Cervical Mass (___cm)] : no neck mass was observed [Jugular Venous Distention Increased] : there was no jugular-venous distention [Thyroid Diffuse Enlargement] : the thyroid was not enlarged [Thyroid Nodule] : there were no palpable thyroid nodules

## 2022-03-01 NOTE — DISCUSSION/SUMMARY
[FreeTextEntry1] : Trial of baclofen for nocturnal cramps; re-check CMPCBC now and LFT after 3 months. How=twan lift. PFT, skin\par Continue riluzole

## 2022-03-01 NOTE — HISTORY OF PRESENT ILLNESS
[FreeTextEntry1] : Twitching has spread to the legs and weakening of legs is beginning to impair her. Cramps at night that could awaken her. Bladder and bowel (difficulty moving bowels) ; concerned about Riluzole effect on the liver because she is a Hepatitis B carrier.

## 2022-11-14 ENCOUNTER — APPOINTMENT (OUTPATIENT)
Dept: NEUROLOGY | Facility: CLINIC | Age: 62
End: 2022-11-14

## 2022-11-14 VITALS
BODY MASS INDEX: 23.04 KG/M2 | DIASTOLIC BLOOD PRESSURE: 83 MMHG | WEIGHT: 130 LBS | HEIGHT: 63 IN | HEART RATE: 74 BPM | SYSTOLIC BLOOD PRESSURE: 121 MMHG

## 2022-11-14 PROCEDURE — 92610 EVALUATE SWALLOWING FUNCTION: CPT | Mod: GP,GO

## 2022-11-14 PROCEDURE — 97165 OT EVAL LOW COMPLEX 30 MIN: CPT | Mod: GP,GO

## 2022-11-14 PROCEDURE — 97162 PT EVAL MOD COMPLEX 30 MIN: CPT | Mod: GP,GO

## 2022-11-14 PROCEDURE — 99215 OFFICE O/P EST HI 40 MIN: CPT | Mod: 25

## 2022-11-14 NOTE — ASSESSMENT
[FreeTextEntry1] : Clinical Swallow Evaluation\par \par Patient was seen during ALS clinic this date for a clinical re-assessment of speech-language and swallow function. Patient was accompanied by daughter who served as a reliable informant regarding patient's overall health, communication and swallow function being that patient is severely dysarthric and did not bring AAC device to today's appointment. Patient uses AAC device at home and reports plan to follow up with [facility] to reinitiate training with daughter. Patient continues to eat/drink and mouth despite previous recommendations given wishes for no feeding tube despite previous recommendations. Patient has been consuming pureed/blenderized textures and thin liquids via straw. Daughter states swallowing is getting more difficulty for patient. Daughter also reported 10 lb weight loss since time of last clinic (Feburary 2022)\par \par Oroperipheral Examination: Facial symmetry was intact. The oral cavity was well hydrated. Secretion management was adequate. Labial strength and ROM was moderately reduced upon pursing and retraction. Lingual strength and ROM was moderately reduced upon protrusion, elevation and lateralization, +fasciculations. There are fasciculations noted upon protrusion. Upper airway breath sounds were clear. \par \par Oral Feeding Assessment: \par Consistencies Administered: Pureed; Moderately-thick liquid; Thin liquid\par Mode of Presentation: Teaspoon\par 1.Oral Phase: The patient demonstrated a moderate oral dysphagia characterized by weak labial stripping of bolus from utensil followed by prolonged bolus collection, manipulation and transport with suspected premature spillage over the base of tongue for pureed, honey-thick and thin liquids. Adequate labial containment noted at this time. \par 2. Pharyngeal Phase: The patient demonstrated a moderate to severe pharyngeal dysphagia characterized by delayed swallow trigger, reduced hyolaryngeal excursion upon digital palpation and use of multiple, labored swallows suggestive of pharyngeal stasis. Delayed coughing noted post thin liquid trials suggesting laryngeal penetration vs aspiration. Inconsistent cough noted post deglutition for pureed and moderately-thick liquids, also suggesting laryngeal penetration vs aspiration.\par \par Impressions: \par 1) Moderate to Severe Oropharyngeal Dysphagia\par 2) Severe Dysarthria \par \par RECOMMENDATIONS:\par 1) Oral nutrition/hydration/medication is deemed contraindicated at this time. Consider long-term non-oral means of nutrition/hydration/medication if consistent with GOC discussion with MD. \par 2) Should patient wish to continue oral feeds despite understanding risks involved (i.e. aspiration, pneumonia), a Pureed and Moderately-Thick Liquid diet via TEASPOON ONLY is recommended with strict adherence to dysphagia guidelines.\par 3) Dysphagia Guidelines: Upright position (90 degree angle) during/after meals for 30 minutes; Slow rate of intake; Small teaspoon amounts; Allow time between intakes; NO straws.\par 4) Maintain Aspiration Precautions\par 5) Meticulous oral hygiene\par 6) A Pulmonary Consult is recommended to monitor pulmonary status given patient wishes to continue oral feedings in the setting of dysphagia and high aspiration risk\par 7) A Nutrition Consult is recommended to assess / monitor daily caloric and hydration needs given ongoing weight loss\par 8) Follow up with Neurologist and ALS clinic as directed\par \par EDUCATION: The aforementioned recommendations were discussed at length with the patient and her daughter. In addition, risks of continuing PO feeds were reviewed, as well as strategies to maximize feeding safety and efficiency given patient wishes to continue oral feeds at this time. The patient was also encouraged to continue use of AAC device. Clinician contact information was provided. Full understanding was demonstrated. \par \par Simona Cary M.S., CCC-SLP\par Speech-Language Pathologist. \par \par

## 2022-12-02 NOTE — PHYSICAL EXAM
[Person] : oriented to person [Place] : oriented to place [Time] : oriented to time [Short Term Intact] : short term memory intact [Remote Intact] : remote memory intact [Registration Intact] : recent registration memory intact [3] : ankle plantar flexion 3/5 [1] : great toe flexion 1/5 [Sensation Tactile Decrease] : light touch was intact [Non-ambulatory] : Non-ambulatory [1+] : Triceps left 1+ [0] : Ankle jerk left 0 [Cranial Nerves Optic (II)] : visual acuity intact bilaterally,  visual fields full to confrontation, pupils equal round and reactive to light [Cranial Nerves Oculomotor (III)] : extraocular motion intact [Cranial Nerves Trigeminal (V)] : facial sensation intact symmetrically [Cranial Nerves Facial (VII)] : face symmetrical [Cranial Nerves Vestibulocochlear (VIII)] : hearing was intact bilaterally [Diminished Palate Elevation] : palate elevation was diminished [Cranial Nerves Vagus Uvula Deviated To Right] : uvula was not deviated to the right [Cranial Nerves Diminished Gag Reflex Right Only] : gag was not diminished on the right [Dysdiadochokinesia Bilaterally] : not present [Coordination - Dysmetria Impaired Finger-to-Nose Bilateral] : not present [Plantar Reflex Right Only] : normal on the right [Plantar Reflex Left Only] : normal on the left [Sclera] : the sclera and conjunctiva were normal [PERRL With Normal Accommodation] : pupils were equal in size, round, reactive to light, with normal accommodation [Extraocular Movements] : extraocular movements were intact [Outer Ear] : the ears and nose were normal in appearance [Oropharynx] : the oropharynx was normal

## 2022-12-02 NOTE — DISCUSSION/SUMMARY
[FreeTextEntry1] : Amyotrophic lateral sclerosis (G 12.21) (335.20)\par \par Evaluated by me today in multidisciplinary ALS clinic; required evaluations today by PT/OT/speech and swallowing therapists.  Social work needs addressed and goals of care reinforced.  End-of-life care including healthcare proxy and patient wishes were discussed.\par \par Nutritional/dietary needs discussed and addressed.\par \par Pulmonary function was assessed by respiratory therapy and spirometry.  Patient's respiratory function is \par FVC 50%, NIF-40, O2sat 94%, CO2 36 , HR 67, RR 19, cough flow 80. Not using cough flow . Recommended NIV for night\par This patient has ALS, a progressive neuromuscular disorder now presenting with findings and symptoms of respiratory insufficiency due to progressive respiratory muscle weakness.  These findings indicate an immediate need for volume ventilator applied noninvasively during sleep, and for mouthpiece ventilation in the daytime  for labored breathing. \par This ventilator must have adjustable alarms to assure uninterrupted ventilation and alerting in case of any adverse events. It must also have battery-powered capability,  to allow portability for outside- inside safe transfer and also to assure ventilation during power outages.  Volume focused/controlled ventilation to ensure adequate minute ventilation for carbon dioxide elimination is also essential.  Because the patient's ventilation needs will continue to change as ALS progresses, with a variable rate of decline, the delivered minute volume ventilation must be maintained and should not require constant in home adjustments such as is common with basic pressure support devices such as BIPAP/ AVAPs\par \par Saliva management needs assessed \par \par  PT/OT recommends: \par \par  Speech swallow therapy recommends: progressive loss of 16 lb weight loss; aspiration suspected; but offer modified barium swallow declined; doesn't want G-tube. Safest pureed and moderately thick liquids with teaspoon. Recommend pulmonary and nutrition\par \par  Continue riluzole 50 mg twice daily \par \par  Refill \par \par  Follow-up in 6 months at ALS clinic \par \par

## 2022-12-02 NOTE — HISTORY OF PRESENT ILLNESS
[FreeTextEntry1] : Increasing difficulty with speech. swallowing is stable; movements are as restricted as before. Interested in the new medicines discussed in ALS web site

## 2023-03-13 ENCOUNTER — APPOINTMENT (OUTPATIENT)
Dept: NEUROLOGY | Facility: CLINIC | Age: 63
End: 2023-03-13
Payer: MEDICARE

## 2023-03-13 PROCEDURE — 92610 EVALUATE SWALLOWING FUNCTION: CPT | Mod: GP,GO

## 2023-03-13 PROCEDURE — 97165 OT EVAL LOW COMPLEX 30 MIN: CPT | Mod: GP,GO

## 2023-03-13 PROCEDURE — 97162 PT EVAL MOD COMPLEX 30 MIN: CPT | Mod: GP,GO

## 2023-03-13 PROCEDURE — 99215 OFFICE O/P EST HI 40 MIN: CPT | Mod: 25

## 2023-03-13 NOTE — ASSESSMENT
[FreeTextEntry1] : Clinical Swallow Evaluation\par \par Patient was seen during ALS clinic this date for a clinical re-assessment of swallow function. Patient was accompanied by daughter who served as a reliable informant regarding patient's overall health and swallow function. Per daughter, patient uses AAC device at home however eye gaze is somewhat fatiguing to difficulty keep head still. Patient does have headband support, however this is reportedly uncomfortable for her. Patient currently consumes a pureed diet and thin liquids. She denies recent history of pneumonia or URI. Per daughter, patient had Covid at end of November/early December. No recent hospitalizations. She continues to lose weight. \par \par Oroperipheral Examination: Facial symmetry was intact. The oral cavity was well hydrated. Secretion management was adequate. Labial strength and ROM was moderately reduced upon pursing and retraction. Lingual strength and ROM was moderately reduced upon protrusion, elevation and lateralization, +fasciculations. There are fasciculations noted upon protrusion. Upper airway breath sounds were clear. \par \par Oral Feeding Assessment: \par Consistencies Administered: Pureed; Moderately-thick liquid; Thin liquid\par Mode of Presentation: Teaspoon\par 1.Oral Phase: The patient demonstrated a moderate oral dysphagia characterized by weak labial stripping of bolus from utensil followed by prolonged bolus collection, manipulation and transport with suspected premature spillage over the base of tongue for pureed, honey-thick and thin liquids. Adequate labial containment noted at this time. \par 2. Pharyngeal Phase: The patient demonstrated a moderate to severe pharyngeal dysphagia characterized by delayed swallow trigger, reduced hyolaryngeal excursion upon digital palpation and use of multiple, labored swallows suggestive of pharyngeal stasis. No overt, clinical s/s of laryngeal penetration/aspiration noted across trials, however unable to rule out silent aspiration at this time. Patient expresses wishes for no feeding tube at this time.\par \par Impressions: \par 1) Moderate to Severe Oropharyngeal Dysphagia\par \par RECOMMENDATIONS:\par 1) Pureed and Moderately-Thick Liquids via TEASPOON ONLY, with strict adherence to guidelines below\par 3) Dysphagia Guidelines: Upright position (90 degree angle) during/after meals for 30 minutes; Slow rate of intake; Small teaspoon amounts; Allow time between intakes; NO straws.\par 4) Maintain Aspiration Precautions\par 5) Meticulous oral hygiene\par 6) Consider a Pulmonary Consult to monitor pulmonary status given high aspiration risk\par 7) Consider a Nutrition Consult is recommended to assess / monitor daily caloric and hydration needs given ongoing weight loss\par 8) SLP to follow up via ALS clinic\par 9) Follow up with Neurologist and ALS clinic as directed\par \par EDUCATION: The aforementioned recommendations were discussed at length with the patient and her daughter. Also reviewed risks of not adhering to above recommendations (i.e., aspiration, pneumonia, choking). Patient and daughter expressed full understanding. Lastly, patient and daughter were encouraged to contact with Daphne from [facility] should they need any further training/assistance in AAC device use at home.\par \par Simona Cary M.S., CCC-SLP\par \par

## 2023-03-14 NOTE — DISCUSSION/SUMMARY
[FreeTextEntry1] : Amyotrophic lateral sclerosis (G 12.21) (335.20) ALS FRS 13\par \par Evaluated by me today in multidisciplinary ALS clinic; required evaluations today by PT/OT/speech and swallowing therapists.  Social work needs addressed and goals of care reinforced.  End-of-life care including healthcare proxy and patient wishes were discussed. Has home nursing agency ; health care proxy is her daughter.\par \par Nutritional/dietary needs discussed and addressed.\par \par Pulmonary function was assessed by respiratory therapy and spirometry. O2sat 94%, VC 37; NIF-30, CO2 40, HR 68;  NIV and cough assist from [facility] surgical; n\par cannot get acclimated ; asked to use it when awake so she can get acclimatized to it ; not using cough assist; encouraged to use it; daughter aware of method of use of all machines\par Saliva management needs assessed \par \par  PT/OT recommends: declined services offered\par \par  Speech swallow therapy recommends: recommended feeding tube- declined; severe dysphagia but no pneumonia;advised  thickened liquids; pureed and moderately thick foods; nutrition is at risk because swallowing is labored\par \par  Continue riluzole 50 mg twice daily \par \par  Refill \par \par  Follow-up in 6 months at ALS clinic \par \par

## 2023-03-14 NOTE — DISCUSSION/SUMMARY
[FreeTextEntry1] : Amyotrophic lateral sclerosis (G 12.21) (335.20) ALS FRS 13\par \par Evaluated by me today in multidisciplinary ALS clinic; required evaluations today by PT/OT/speech and swallowing therapists.  Social work needs addressed and goals of care reinforced.  End-of-life care including healthcare proxy and patient wishes were discussed. Has home nursing agency ; health care proxy is her daughter.\par \par Nutritional/dietary needs discussed and addressed.\par \par Pulmonary function was assessed by respiratory therapy and spirometry. O2sat 94%, VC 37; NIF-30, CO2 40, HR 68;  NIV and cough assist from [facility] surgical; n\par cannot get acclimated ; asked to use it when awake so she can get acclimatized to it ; not using cough assist; encouraged to use it; daughter aware of method of use of all machines\par Saliva management needs assessed \par \par  PT/OT recommends: declined services offered\par \par  Speech swallow therapy recommends: recommended feeding tube- declined; severe dysphagia but no pneumonia;advised  thickened liquids; pureed and moderately thick foods; nutrition is at risk because swallowing is labored\par \par  Continue riluzole 50 mg twice daily \par \par  Refill \par \par  Follow-up in 6 months at ALS clinic \par \par

## 2023-03-14 NOTE — PHYSICAL EXAM
[Person] : oriented to person [Place] : oriented to place [Time] : oriented to time [Cranial Nerves Optic (II)] : visual acuity intact bilaterally,  visual fields full to confrontation, pupils equal round and reactive to light [Cranial Nerves Oculomotor (III)] : extraocular motion intact [Cranial Nerves Trigeminal (V)] : facial sensation intact symmetrically [Cranial Nerves Vestibulocochlear (VIII)] : hearing was intact bilaterally [Cranial Nerves Facial Peripheral - Right Only] : peripheral 7th nerve weakness [Cranial Nerves Right Facial: House Grade ___(1-6)] : grade IV (moderately severe, 40%) facial nerve function [Cranial Nerves Facial Peripheral - Left Only] : peripheral 7th nerve weakness [Cranial Nerves Left Facial: House Grade ___(1-6)] : grade IV (moderately severe, 40%) facial nerve function [Diminished Palate Elevation] : palate elevation was diminished [CNS Accessory - Diminished Shoulder Elevation (Trapezius)] : weakness of shoulder elevation was present bilaterally [CNS Accessory - Sternocleidomastoid Weakness Bilateral] : sternocleidomastoid weakness was present bilaterally [CNS Hypoglossal Tongue Protrusion Deviated To Right] : tongue does not deviate to the right [CNS Hypoglossal Tongue Protrusion Deviated To Left] : tongue does not deviate to the left [Sensation Tactile Decrease] : light touch was intact [Sensation Vibration Decrease] : vibration was intact [Non-ambulatory] : Non-ambulatory [2+] : Biceps left 2+ [1+] : Patella right 1+ [0] : Ankle jerk left 0 [Plantar Reflex Left Only] : abnormal on the left [FreeTextEntry5] : tongue barely protrudes although straight  [FreeTextEntry8] : unable to test due to motor weakness quadruplegia [Sclera] : the sclera and conjunctiva were normal [PERRL With Normal Accommodation] : pupils were equal in size, round, reactive to light, with normal accommodation [Extraocular Movements] : extraocular movements were intact [Outer Ear] : the ears and nose were normal in appearance [Oropharynx] : the oropharynx was normal [Neck Appearance] : the appearance of the neck was normal [Neck Cervical Mass (___cm)] : no neck mass was observed [Jugular Venous Distention Increased] : there was no jugular-venous distention [Thyroid Diffuse Enlargement] : the thyroid was not enlarged [Thyroid Nodule] : there were no palpable thyroid nodules [Auscultation Breath Sounds / Voice Sounds] : lungs were clear to auscultation bilaterally [Heart Rate And Rhythm] : heart rate was normal and rhythm regular [Heart Sounds] : normal S1 and S2 [Heart Sounds Gallop] : no gallops [Murmurs] : no murmurs [Heart Sounds Pericardial Friction Rub] : no pericardial rub [Full Pulse] : the pedal pulses are present [Edema] : there was no peripheral edema [Bowel Sounds] : normal bowel sounds [Abdomen Soft] : soft [Abdomen Tenderness] : non-tender [] : no hepato-splenomegaly [Abdomen Mass (___ Cm)] : no abdominal mass palpated

## 2023-03-14 NOTE — HISTORY OF PRESENT ILLNESS
[FreeTextEntry1] : Swallowing is stable thickened liquids soft food no choking(rarely); machine adjustment to allow use at night is difficult. They have brought the machine to for the visit. Still able to walk with assistance .  helps adjust her position in bed to help her go back to sleep and adjust tto the CPAP when she awakens. Occasional pain in the right ankle relieved by movement and ibuprofen use at times.

## 2023-09-11 ENCOUNTER — APPOINTMENT (OUTPATIENT)
Dept: NEUROLOGY | Facility: CLINIC | Age: 63
End: 2023-09-11
Payer: MEDICARE

## 2023-09-11 VITALS
OXYGEN SATURATION: 97 % | WEIGHT: 127 LBS | SYSTOLIC BLOOD PRESSURE: 112 MMHG | DIASTOLIC BLOOD PRESSURE: 79 MMHG | HEIGHT: 63 IN | HEART RATE: 72 BPM | BODY MASS INDEX: 22.5 KG/M2 | TEMPERATURE: 94.4 F

## 2023-09-11 PROCEDURE — 97162 PT EVAL MOD COMPLEX 30 MIN: CPT | Mod: GP,GO

## 2023-09-11 PROCEDURE — 92610 EVALUATE SWALLOWING FUNCTION: CPT | Mod: GP,GO

## 2023-09-11 PROCEDURE — 99215 OFFICE O/P EST HI 40 MIN: CPT | Mod: 25

## 2023-09-11 PROCEDURE — 97165 OT EVAL LOW COMPLEX 30 MIN: CPT | Mod: GP,GO

## 2024-01-22 ENCOUNTER — APPOINTMENT (OUTPATIENT)
Dept: NEUROLOGY | Facility: CLINIC | Age: 64
End: 2024-01-22
Payer: MEDICARE

## 2024-01-22 VITALS — BODY MASS INDEX: 22.5 KG/M2 | HEIGHT: 63 IN | WEIGHT: 127 LBS

## 2024-01-22 DIAGNOSIS — F40.9 INSOMNIA DUE TO OTHER MENTAL DISORDER: ICD-10-CM

## 2024-01-22 DIAGNOSIS — F51.05 INSOMNIA DUE TO OTHER MENTAL DISORDER: ICD-10-CM

## 2024-01-22 DIAGNOSIS — S49.91XA UNSPECIFIED INJURY OF RIGHT SHOULDER AND UPPER ARM, INITIAL ENCOUNTER: ICD-10-CM

## 2024-01-22 PROCEDURE — 97162 PT EVAL MOD COMPLEX 30 MIN: CPT | Mod: GP,GO,25

## 2024-01-22 PROCEDURE — 97165 OT EVAL LOW COMPLEX 30 MIN: CPT | Mod: GP,GO,25

## 2024-01-22 PROCEDURE — 99215 OFFICE O/P EST HI 40 MIN: CPT | Mod: 25

## 2024-01-22 PROCEDURE — 92610 EVALUATE SWALLOWING FUNCTION: CPT | Mod: GN

## 2024-01-22 RX ORDER — RILUZOLE 50 MG/1
50 TABLET, FILM COATED ORAL
Qty: 60 | Refills: 5 | Status: ACTIVE | COMMUNITY
Start: 2020-09-08 | End: 1900-01-01

## 2024-01-22 NOTE — DISCUSSION/SUMMARY
[FreeTextEntry1] : Advised to go to the ER but preferred X ray outpatient and follow u with orthopedics. She will go to [facility] radiology center, which is close to home and communicate with ortho I recommend. Quetiapine for insomnia

## 2024-01-22 NOTE — REVIEW OF SYSTEMS
[Fever] : no fever [Feeling Poorly] : feeling poorly [Chills] : no chills [Memory Lapses or Loss] : no memory loss [Confused or Disoriented] : no confusion [Decr. Concentrating Ability] : no decrease in concentrating ability [Changed Thought Patterns] : no change in thought patterns [Repeating Questions] : no repeated questioning about recent events [Facial Weakness] : facial weakness [Arm Weakness] : arm weakness [Hand Weakness] :  hand weakness [Leg Weakness] : leg weakness [Numbness] : no numbness [Hypersensitivity] : no hypersensitivity [Tingling] : no tingling [Abnormal Sensation] : no abnormal sensation [Seizures] : no convulsions [Dizziness] : no dizziness [Migraine Headache] : no migraine headache [Fainting] : no fainting [Vertigo] : no vertigo [Inability to Walk] : inability to walk [Suicidal] : not suicidal [Depression] : no depression [Sleep Disturbances] : sleep disturbances [Anxiety] : anxiety [Emotional Problems] : no emotional problems [Change In Personality] : no personality change [Eyesight Problems] : no eyesight problems [Eye Pain] : no eye pain [Nosebleeds] : no nosebleeds [Earache] : no earache [Loss Of Hearing] : no hearing loss [Sore Throat] : no sore throat [Heart Rate Is Slow] : the heart rate was not slow [Hoarseness] : hoarseness [Heart Rate Is Fast] : the heart rate was not fast [Chest Pain] : no chest pain [Leg Claudication] : no intermittent leg claudication [Palpitations] : no palpitations [Shortness Of Breath] : shortness of breath [Lower Ext Edema] : no lower extremity edema [Wheezing] : no wheezing [Cough] : no cough [SOB on Exertion] : shortness of breath during exertion [Abdominal Pain] : no abdominal pain [Orthopnea] : orthopnea [PND] : no PND [Vomiting] : no vomiting [Constipation] : no constipation [Diarrhea] : no diarrhea [Melena] : no melena [Dysuria] : no dysuria [Incontinence] : no incontinence [Pelvic Pain] : no pelvic pain [Joint Pain] : joint pain [Dysmenorrhea] : no dysmenorrhea [Joint Swelling] : no joint swelling [Joint Stiffness] : no joint stiffness [Limb Pain] : limb pain [Limb Swelling] : no limb swelling [Skin Wound] : no skin wound [Proptosis] : no proptosis [Hot Flashes] : no hot flashes [Easy Bleeding] : no tendency for easy bleeding [Muscle Weakness] : muscle weakness [Easy Bruising] : no tendency for easy bruising

## 2024-01-22 NOTE — ASSESSMENT
[FreeTextEntry1] : Amyotrophic lateral sclerosis (G 12.21) (335.20) ALS FRS 15 Evaluated by me today in multidisciplinary ALS clinic; required evaluations today by PT/OT/speech and swallowing therapists.  Social work needs addressed and goals of care reinforced.  End-of-life care including healthcare proxy and patient wishes were discussed.. [place] immigration letter [employer]. is the new insurance.   Nutritional/dietary needs discussed and addressed.  Pulmonary function was assessed by respiratory therapy and spirometry.  Patient's respiratory function is  O2 sat 95%  Saliva management needs assessed - none   PT/OT recommends: REQUESTED COMMODE LIFT;PT/OT  held off until fractures from fall are excluded.   Speech swallow therapy recommends: Did better today with PO this time than the last appointment . Uses EMST.  Continue riluzole 50 mg twice daily . Declined Radicava and Relyvrio but will discuss with daughter   Refill    Follow-up in 6 months at ALS clinic

## 2024-01-22 NOTE — HISTORY OF PRESENT ILLNESS
[FreeTextEntry1] : Fall right side pain in the right shoulder and clavicle area. Weakness left UE >right UE difficulty speaking.

## 2024-01-22 NOTE — PHYSICAL EXAM
[Person] : oriented to person [Place] : oriented to place [Time] : oriented to time [Remote Intact] : remote memory intact [Short Term Intact] : short term memory intact [Registration Intact] : recent registration memory intact [Span Intact] : the attention span was normal [Concentration Intact] : normal concentrating ability [Visual Intact] : visual attention was ~T not ~L decreased [Naming Objects] : no difficulty naming common objects [Repeating Phrases] : no difficulty repeating a phrase [Writing A Sentence] : no difficulty writing a sentence [Fluency] : fluency intact [Comprehension] : comprehension intact [Reading] : reading intact [Cranial Nerves Optic (II)] : visual acuity intact bilaterally,  visual fields full to confrontation, pupils equal round and reactive to light [Cranial Nerves Oculomotor (III)] : extraocular motion intact [Cranial Nerves Trigeminal (V)] : facial sensation intact symmetrically [Cranial Nerves Vestibulocochlear (VIII)] : hearing was intact bilaterally [Motor Strength Upper Extremities Bilaterally] : there was weakness in both upper extremities [Motor Strength Lower Extremities Bilaterally] : there was weakness in both lower extremities [Sensation Tactile Decrease] : light touch was intact [Sensation Pain / Temperature Decrease] : pain and temperature was intact [Non-ambulatory] : Non-ambulatory [2+] : Patella left 2+ [Sclera] : the sclera and conjunctiva were normal [PERRL With Normal Accommodation] : pupils were equal in size, round, reactive to light, with normal accommodation [Extraocular Movements] : extraocular movements were intact [Outer Ear] : the ears and nose were normal in appearance [Oropharynx] : the oropharynx was normal [Neck Appearance] : the appearance of the neck was normal [Neck Cervical Mass (___cm)] : no neck mass was observed [Jugular Venous Distention Increased] : there was no jugular-venous distention [Thyroid Diffuse Enlargement] : the thyroid was not enlarged [Thyroid Nodule] : there were no palpable thyroid nodules [FreeTextEntry6] : Left 0/5, Right 1/5 fingers and wrist  elbow and shoulder; hip 2-3/5 knees ankle 1-2/5 [FreeTextEntry5] : weakness face tongue palate and  [FreeTextEntry8] : Wheelchair dependent. [FreeTextEntry1] : Painful tender right shoulder sternum and right clavical, right side of chest

## 2024-01-26 RX ORDER — EDARAVONE 105 MG/5ML
105 KIT ORAL
Qty: 2 | Refills: 11 | Status: ACTIVE | COMMUNITY
Start: 2024-01-26 | End: 1900-01-01

## 2024-02-01 RX ORDER — SODIUM PHENYLBUTYRATE/TAURURSODIOL 3; 1 G/1; G/1
3-1 POWDER, FOR SUSPENSION ORAL
Qty: 35 | Refills: 0 | Status: ACTIVE | COMMUNITY
Start: 2024-01-26 | End: 1900-01-01

## 2024-02-01 RX ORDER — SODIUM PHENYLBUTYRATE/TAURURSODIOL 3; 1 G/1; G/1
3-1 POWDER, FOR SUSPENSION ORAL
Qty: 56 | Refills: 11 | Status: ACTIVE | COMMUNITY
Start: 2024-01-26 | End: 1900-01-01

## 2024-02-12 RX ORDER — EDARAVONE 105 MG/5ML
105 KIT ORAL
Qty: 2 | Refills: 0 | Status: ACTIVE | COMMUNITY
Start: 2024-01-26

## 2024-06-14 ENCOUNTER — APPOINTMENT (OUTPATIENT)
Dept: NEUROLOGY | Facility: CLINIC | Age: 64
End: 2024-06-14

## 2024-06-14 VITALS
SYSTOLIC BLOOD PRESSURE: 94 MMHG | BODY MASS INDEX: 20.32 KG/M2 | OXYGEN SATURATION: 96 % | WEIGHT: 119 LBS | HEART RATE: 71 BPM | HEIGHT: 64 IN | DIASTOLIC BLOOD PRESSURE: 65 MMHG

## 2024-06-14 PROCEDURE — 97162 PT EVAL MOD COMPLEX 30 MIN: CPT | Mod: GP,GO,25

## 2024-06-14 PROCEDURE — 99215 OFFICE O/P EST HI 40 MIN: CPT | Mod: 25

## 2024-06-14 PROCEDURE — 92610 EVALUATE SWALLOWING FUNCTION: CPT

## 2024-06-17 RX ORDER — QUETIAPINE FUMARATE 25 MG/1
25 TABLET ORAL
Qty: 60 | Refills: 3 | Status: ACTIVE | COMMUNITY
Start: 2024-01-22 | End: 1900-01-01

## 2024-06-18 NOTE — ASSESSMENT
[FreeTextEntry1] : Amyotrophic lateral sclerosis (G 12.21) (335.20) 14  Evaluated by me today in multidisciplinary ALS clinic; required evaluations today by PT/OT/speech and swallowing therapists.  Social work needs addressed and goals of care reinforced.  End-of-life care including healthcare proxy and patient wishes were discussed. DNR /DNI packet was done with the visiting nurse and a copy willl be obtained   Nutritional/dietary needs discussed and addressed.  Pulmonary function was assessed by respiratory therapy and spirometry.  Patient's respiratory function is  FVC 28 PEF 14  COUGH 60, CO2 37 PO2 96 Saliva management needs assessed needs and suction and cough assist   PT/OT recommendations: Declined. Recommend PT/OT New shower chair. (From joselin crandall from [facility]) Cushion for power chair showed how to inflate it.    Speech swallow therapy recommends: Worsening; recommend thin liquids with chin tuck; cannot tolerate solids in that position; pudding 3-4 swallows to clear; alternate liquids with solid:downgrade to puree; saliva production increased. more difficulty with speech; ensure 2-3 months a& losing weight slower rate   Continue riluzole 50 mg twice daily    Refill    Follow-up in 6 months at ALS clinic  .  A cough assist device is being ordered for mobilization of secretions and for chest wall expansion.  I do not feel that her flutter device, Acapella device, chest PT nor positive expiratory pressure devices are sufficient for this patient's progressive neuromuscular disease.  None of these other pieces of equipment will be effective in prevention of aspiration pneumonia, hospitalizations and intubation and possibly death.  [place]livia is a part of the best practice guidelines for neuromuscular diseases. This patient has ALS, a progressive neuromuscular disorder now presenting with findings and symptoms of respiratory insufficiency due to progressive respiratory muscle weakness.  These findings indicate an immediate need for volume ventilator applied noninvasively during sleep, and for mouthpiece ventilation in the daytime  for labored breathing.  This ventilator must have adjustable alarms to assure uninterrupted ventilation and alerting in case of any adverse events. It must also have battery-powered capability,  to allow portability for outside- inside safe transfer and also to assure ventilation during power outages.   Volume focused/controlled ventilation to ensure adequate minute ventilation for carbon dioxide elimination is also essential.  Because the patient's ventilation needs will continue to change as ALS progresses, with a variable rate of decline, the delivered minute volume ventilation must be maintained and should not require constant in home adjustments such as is common with basic pressure support devices such as BIPAP/ AVAPs

## 2024-06-18 NOTE — HISTORY OF PRESENT ILLNESS
[FreeTextEntry1] : 1/22/2024: Fall right side pain in the right shoulder and clavicle area. Weakness left UE >right UE difficulty speaking.   6/14/2024: Increased weakness both LE ; using the power wheelchair more; pain in the calf and knees

## 2024-07-23 ENCOUNTER — NON-APPOINTMENT (OUTPATIENT)
Age: 64
End: 2024-07-23

## 2024-10-11 ENCOUNTER — APPOINTMENT (OUTPATIENT)
Dept: NEUROLOGY | Facility: CLINIC | Age: 64
End: 2024-10-11

## 2024-10-11 VITALS
HEART RATE: 67 BPM | HEIGHT: 64 IN | RESPIRATION RATE: 16 BRPM | SYSTOLIC BLOOD PRESSURE: 120 MMHG | BODY MASS INDEX: 20.66 KG/M2 | DIASTOLIC BLOOD PRESSURE: 79 MMHG | WEIGHT: 121 LBS

## 2024-10-11 DIAGNOSIS — R47.1 DYSARTHRIA AND ANARTHRIA: ICD-10-CM

## 2024-10-11 DIAGNOSIS — R13.19 OTHER DYSPHAGIA: ICD-10-CM

## 2024-10-11 DIAGNOSIS — G82.50 QUADRIPLEGIA, UNSPECIFIED: ICD-10-CM

## 2024-10-11 DIAGNOSIS — G12.21 AMYOTROPHIC LATERAL SCLEROSIS: ICD-10-CM

## 2024-10-11 PROCEDURE — 97166 OT EVAL MOD COMPLEX 45 MIN: CPT | Mod: GP,GO,25

## 2024-10-11 PROCEDURE — 99215 OFFICE O/P EST HI 40 MIN: CPT | Mod: 25

## 2024-10-11 PROCEDURE — 97163 PT EVAL HIGH COMPLEX 45 MIN: CPT | Mod: GP,25

## 2024-10-11 PROCEDURE — 92610 EVALUATE SWALLOWING FUNCTION: CPT

## 2024-10-14 PROBLEM — G82.50 QUADRIPARESIS: Status: ACTIVE | Noted: 2024-10-14

## 2024-10-14 PROBLEM — R13.19 OTHER DYSPHAGIA: Status: ACTIVE | Noted: 2024-10-14

## 2024-10-14 PROBLEM — R47.1 DYSARTHRIA AND ANARTHRIA: Status: ACTIVE | Noted: 2024-10-14

## 2024-12-20 ENCOUNTER — RX RENEWAL (OUTPATIENT)
Age: 64
End: 2024-12-20